# Patient Record
Sex: MALE | Race: WHITE | Employment: OTHER | URBAN - METROPOLITAN AREA
[De-identification: names, ages, dates, MRNs, and addresses within clinical notes are randomized per-mention and may not be internally consistent; named-entity substitution may affect disease eponyms.]

---

## 2017-05-18 ENCOUNTER — OFFICE VISIT (OUTPATIENT)
Dept: FAMILY MEDICINE CLINIC | Age: 62
End: 2017-05-18

## 2017-05-18 VITALS
BODY MASS INDEX: 36.43 KG/M2 | WEIGHT: 246 LBS | SYSTOLIC BLOOD PRESSURE: 120 MMHG | HEART RATE: 76 BPM | HEIGHT: 69 IN | DIASTOLIC BLOOD PRESSURE: 70 MMHG

## 2017-05-18 DIAGNOSIS — N52.9 VASCULOGENIC ERECTILE DYSFUNCTION, UNSPECIFIED VASCULOGENIC ERECTILE DYSFUNCTION TYPE: ICD-10-CM

## 2017-05-18 DIAGNOSIS — M15.9 PRIMARY OSTEOARTHRITIS INVOLVING MULTIPLE JOINTS: ICD-10-CM

## 2017-05-18 DIAGNOSIS — K21.9 GASTROESOPHAGEAL REFLUX DISEASE WITHOUT ESOPHAGITIS: ICD-10-CM

## 2017-05-18 DIAGNOSIS — I25.10 CORONARY ARTERY DISEASE INVOLVING NATIVE CORONARY ARTERY OF NATIVE HEART WITHOUT ANGINA PECTORIS: Primary | ICD-10-CM

## 2017-05-18 DIAGNOSIS — Z12.11 SCREEN FOR COLON CANCER: ICD-10-CM

## 2017-05-18 LAB
A/G RATIO: 1.7 (ref 1.1–2.2)
ALBUMIN SERPL-MCNC: 4.5 G/DL (ref 3.4–5)
ALP BLD-CCNC: 43 U/L (ref 40–129)
ALT SERPL-CCNC: 26 U/L (ref 10–40)
ANION GAP SERPL CALCULATED.3IONS-SCNC: 14 MMOL/L (ref 3–16)
AST SERPL-CCNC: 27 U/L (ref 15–37)
BILIRUB SERPL-MCNC: 0.3 MG/DL (ref 0–1)
BUN BLDV-MCNC: 11 MG/DL (ref 7–20)
CALCIUM SERPL-MCNC: 9.5 MG/DL (ref 8.3–10.6)
CHLORIDE BLD-SCNC: 98 MMOL/L (ref 99–110)
CHOLESTEROL, TOTAL: 194 MG/DL (ref 0–199)
CO2: 25 MMOL/L (ref 21–32)
CREAT SERPL-MCNC: 0.9 MG/DL (ref 0.8–1.3)
GFR AFRICAN AMERICAN: >60
GFR NON-AFRICAN AMERICAN: >60
GLOBULIN: 2.7 G/DL
GLUCOSE BLD-MCNC: 106 MG/DL (ref 70–99)
HDLC SERPL-MCNC: 31 MG/DL (ref 40–60)
LDL CHOLESTEROL CALCULATED: 118 MG/DL
POTASSIUM SERPL-SCNC: 4.4 MMOL/L (ref 3.5–5.1)
PROSTATE SPECIFIC ANTIGEN: 10.06 NG/ML (ref 0–4)
SODIUM BLD-SCNC: 137 MMOL/L (ref 136–145)
TOTAL PROTEIN: 7.2 G/DL (ref 6.4–8.2)
TRIGL SERPL-MCNC: 226 MG/DL (ref 0–150)
VLDLC SERPL CALC-MCNC: 45 MG/DL

## 2017-05-18 PROCEDURE — 99213 OFFICE O/P EST LOW 20 MIN: CPT | Performed by: FAMILY MEDICINE

## 2017-05-18 PROCEDURE — 36415 COLL VENOUS BLD VENIPUNCTURE: CPT | Performed by: FAMILY MEDICINE

## 2017-05-18 RX ORDER — DOXAZOSIN MESYLATE 4 MG/1
TABLET ORAL
Qty: 90 TABLET | Refills: 1 | Status: SHIPPED | OUTPATIENT
Start: 2017-05-18 | End: 2017-10-25 | Stop reason: SDUPTHER

## 2017-05-18 RX ORDER — ATORVASTATIN CALCIUM 40 MG/1
40 TABLET, FILM COATED ORAL DAILY
Qty: 30 TABLET | Refills: 5 | Status: CANCELLED | OUTPATIENT
Start: 2017-05-18

## 2017-05-18 RX ORDER — ATORVASTATIN CALCIUM 80 MG/1
80 TABLET, FILM COATED ORAL DAILY
Qty: 90 TABLET | Refills: 1 | Status: SHIPPED | OUTPATIENT
Start: 2017-05-18 | End: 2017-10-25 | Stop reason: SDUPTHER

## 2017-05-18 RX ORDER — OMEPRAZOLE 40 MG/1
CAPSULE, DELAYED RELEASE ORAL
Qty: 90 CAPSULE | Refills: 1 | Status: SHIPPED | OUTPATIENT
Start: 2017-05-18 | End: 2017-10-25 | Stop reason: SDUPTHER

## 2017-05-18 RX ORDER — SERTRALINE HYDROCHLORIDE 100 MG/1
TABLET, FILM COATED ORAL
Qty: 100 TABLET | Refills: 5 | Status: SHIPPED | OUTPATIENT
Start: 2017-05-18 | End: 2017-10-25 | Stop reason: SDUPTHER

## 2017-05-18 RX ORDER — ALPRAZOLAM 1 MG/1
TABLET ORAL
Qty: 90 TABLET | Refills: 2 | Status: SHIPPED | OUTPATIENT
Start: 2017-05-18 | End: 2017-07-11 | Stop reason: SDUPTHER

## 2017-05-18 RX ORDER — ISOSORBIDE DINITRATE 30 MG/1
TABLET ORAL
Qty: 90 TABLET | Refills: 1 | Status: CANCELLED | OUTPATIENT
Start: 2017-05-18

## 2017-05-18 RX ORDER — SILDENAFIL 100 MG/1
100 TABLET, FILM COATED ORAL PRN
Qty: 6 TABLET | Refills: 2 | COMMUNITY
Start: 2017-05-18 | End: 2020-05-05

## 2017-05-18 ASSESSMENT — ENCOUNTER SYMPTOMS
SHORTNESS OF BREATH: 0
CHOKING: 0
ABDOMINAL PAIN: 0
ABDOMINAL DISTENTION: 0
CHEST TIGHTNESS: 0

## 2017-05-20 LAB
SEX HORMONE BINDING GLOBULIN: 38 NMOL/L (ref 11–80)
TESTOSTERONE FREE PERCENT: 1.7 % (ref 1.6–2.9)
TESTOSTERONE FREE, CALC: 47 PG/ML (ref 47–244)
TESTOSTERONE TOTAL-MALE: 284 NG/DL (ref 300–720)

## 2017-05-22 ENCOUNTER — TELEPHONE (OUTPATIENT)
Dept: FAMILY MEDICINE CLINIC | Age: 62
End: 2017-05-22

## 2017-07-11 ENCOUNTER — OFFICE VISIT (OUTPATIENT)
Dept: FAMILY MEDICINE CLINIC | Age: 62
End: 2017-07-11

## 2017-07-11 VITALS
HEIGHT: 69 IN | BODY MASS INDEX: 36.58 KG/M2 | SYSTOLIC BLOOD PRESSURE: 140 MMHG | OXYGEN SATURATION: 97 % | WEIGHT: 247 LBS | HEART RATE: 79 BPM | DIASTOLIC BLOOD PRESSURE: 74 MMHG

## 2017-07-11 DIAGNOSIS — Z79.899 LONG TERM USE OF DRUG: ICD-10-CM

## 2017-07-11 DIAGNOSIS — F41.9 ANXIETY: ICD-10-CM

## 2017-07-11 DIAGNOSIS — R07.9 CHEST PAIN, UNSPECIFIED TYPE: ICD-10-CM

## 2017-07-11 DIAGNOSIS — Z22.322 MRSA (METHICILLIN RESISTANT STAPH AUREUS) CULTURE POSITIVE: Primary | ICD-10-CM

## 2017-07-11 DIAGNOSIS — G25.81 RLS (RESTLESS LEGS SYNDROME): ICD-10-CM

## 2017-07-11 DIAGNOSIS — E78.1 HYPERGLYCERIDEMIA: ICD-10-CM

## 2017-07-11 LAB
AMPHETAMINE SCREEN, URINE: NORMAL
BARBITURATE SCREEN, URINE: NORMAL
BENZODIAZEPINE SCREEN, URINE: NORMAL
COCAINE METABOLITE SCREEN URINE: NORMAL
MDMA URINE: NORMAL
METHADONE SCREEN, URINE: NORMAL
METHAMPHETAMINE, URINE: NORMAL
OPIATE SCREEN URINE: NORMAL
OXYCODONE SCREEN URINE: NORMAL
PHENCYCLIDINE SCREEN URINE: NORMAL
PROPOXYPHENE SCREEN, URINE: NORMAL
THC: NORMAL
TRICYCLIC ANTIDEPRESSANTS, UR: NORMAL

## 2017-07-11 PROCEDURE — 99213 OFFICE O/P EST LOW 20 MIN: CPT | Performed by: NURSE PRACTITIONER

## 2017-07-11 PROCEDURE — 80305 DRUG TEST PRSMV DIR OPT OBS: CPT | Performed by: NURSE PRACTITIONER

## 2017-07-11 RX ORDER — CLINDAMYCIN HYDROCHLORIDE 300 MG/1
300 CAPSULE ORAL 3 TIMES DAILY
Qty: 30 CAPSULE | Refills: 0 | Status: SHIPPED | OUTPATIENT
Start: 2017-07-11 | End: 2017-07-21

## 2017-07-11 RX ORDER — NITROGLYCERIN 0.4 MG/1
TABLET SUBLINGUAL
Refills: 1 | COMMUNITY
Start: 2017-05-10 | End: 2020-05-05 | Stop reason: SDUPTHER

## 2017-07-11 RX ORDER — DOXYCYCLINE 100 MG/1
CAPSULE ORAL
Refills: 0 | COMMUNITY
Start: 2017-07-02 | End: 2017-10-25 | Stop reason: ALTCHOICE

## 2017-07-11 RX ORDER — ALPRAZOLAM 1 MG/1
TABLET ORAL
Qty: 45 TABLET | Refills: 2 | Status: SHIPPED | OUTPATIENT
Start: 2017-07-11 | End: 2017-10-25 | Stop reason: SDUPTHER

## 2017-07-11 RX ORDER — FENOFIBRATE 160 MG/1
TABLET ORAL
Refills: 11 | COMMUNITY
Start: 2017-06-30 | End: 2020-05-05

## 2017-07-11 ASSESSMENT — ENCOUNTER SYMPTOMS
COUGH: 0
NAUSEA: 0
WHEEZING: 0
VOMITING: 0
SHORTNESS OF BREATH: 0
COLOR CHANGE: 1
CHOKING: 0
CHEST TIGHTNESS: 0

## 2017-10-02 ENCOUNTER — TELEPHONE (OUTPATIENT)
Dept: OTHER | Facility: CLINIC | Age: 62
End: 2017-10-02

## 2017-10-25 ENCOUNTER — OFFICE VISIT (OUTPATIENT)
Dept: FAMILY MEDICINE CLINIC | Age: 62
End: 2017-10-25

## 2017-10-25 VITALS
HEART RATE: 72 BPM | HEIGHT: 69 IN | DIASTOLIC BLOOD PRESSURE: 60 MMHG | SYSTOLIC BLOOD PRESSURE: 110 MMHG | OXYGEN SATURATION: 96 % | WEIGHT: 258 LBS | BODY MASS INDEX: 38.21 KG/M2

## 2017-10-25 DIAGNOSIS — R97.20 ELEVATED PSA: ICD-10-CM

## 2017-10-25 DIAGNOSIS — K21.9 GASTROESOPHAGEAL REFLUX DISEASE WITHOUT ESOPHAGITIS: ICD-10-CM

## 2017-10-25 DIAGNOSIS — F41.9 ANXIETY: ICD-10-CM

## 2017-10-25 DIAGNOSIS — N40.0 BENIGN PROSTATIC HYPERPLASIA WITHOUT LOWER URINARY TRACT SYMPTOMS: ICD-10-CM

## 2017-10-25 DIAGNOSIS — I25.10 CORONARY ARTERY DISEASE INVOLVING NATIVE CORONARY ARTERY OF NATIVE HEART WITHOUT ANGINA PECTORIS: ICD-10-CM

## 2017-10-25 DIAGNOSIS — E78.2 MIXED HYPERLIPIDEMIA: Primary | ICD-10-CM

## 2017-10-25 DIAGNOSIS — E66.09 CLASS 2 OBESITY DUE TO EXCESS CALORIES WITHOUT SERIOUS COMORBIDITY WITH BODY MASS INDEX (BMI) OF 36.0 TO 36.9 IN ADULT: ICD-10-CM

## 2017-10-25 PROCEDURE — 99213 OFFICE O/P EST LOW 20 MIN: CPT | Performed by: FAMILY MEDICINE

## 2017-10-25 RX ORDER — SERTRALINE HYDROCHLORIDE 100 MG/1
TABLET, FILM COATED ORAL
Qty: 100 TABLET | Refills: 5 | Status: SHIPPED | OUTPATIENT
Start: 2017-10-25 | End: 2020-07-07 | Stop reason: SDUPTHER

## 2017-10-25 RX ORDER — ATORVASTATIN CALCIUM 80 MG/1
80 TABLET, FILM COATED ORAL DAILY
Qty: 90 TABLET | Refills: 1 | Status: SHIPPED | OUTPATIENT
Start: 2017-10-25 | End: 2020-05-05 | Stop reason: SDUPTHER

## 2017-10-25 RX ORDER — ISOSORBIDE MONONITRATE 30 MG/1
30 TABLET, EXTENDED RELEASE ORAL
COMMUNITY
Start: 2017-04-12 | End: 2017-10-25 | Stop reason: ALTCHOICE

## 2017-10-25 RX ORDER — DOXAZOSIN MESYLATE 4 MG/1
TABLET ORAL
Qty: 90 TABLET | Refills: 1 | Status: SHIPPED | OUTPATIENT
Start: 2017-10-25 | End: 2020-05-05

## 2017-10-25 RX ORDER — OMEPRAZOLE 40 MG/1
CAPSULE, DELAYED RELEASE ORAL
Qty: 90 CAPSULE | Refills: 1 | Status: SHIPPED | OUTPATIENT
Start: 2017-10-25 | End: 2020-05-05

## 2017-10-25 RX ORDER — ALPRAZOLAM 1 MG/1
TABLET ORAL
Qty: 45 TABLET | Refills: 2 | Status: SHIPPED | OUTPATIENT
Start: 2017-10-25 | End: 2020-05-05

## 2017-10-25 RX ORDER — AMLODIPINE BESYLATE 5 MG/1
5 TABLET ORAL
COMMUNITY
Start: 2017-10-12 | End: 2020-05-05

## 2017-10-25 ASSESSMENT — ENCOUNTER SYMPTOMS
COUGH: 0
BACK PAIN: 0
CHEST TIGHTNESS: 0
CONSTIPATION: 0
SHORTNESS OF BREATH: 0
WHEEZING: 0
CHOKING: 0
ABDOMINAL PAIN: 0
DIARRHEA: 0

## 2017-10-25 NOTE — PROGRESS NOTES
Subjective:      Patient ID: Jany Pascal is a 57 y.o. male. HPIhere for BP and lipid issues  Has had 2 stents placed already    Review of Systems   Constitutional: Negative for activity change, appetite change and fatigue. HENT: Negative for dental problem and tinnitus. Respiratory: Negative for cough, choking, chest tightness, shortness of breath and wheezing. Cardiovascular: Negative for chest pain and leg swelling. Gastrointestinal: Negative for abdominal pain, constipation and diarrhea. Endocrine: Negative for polydipsia and polyphagia. Musculoskeletal: Negative for back pain and neck stiffness. Neurological: Negative for dizziness, syncope, speech difficulty and headaches. Psychiatric/Behavioral: Negative. Objective:   Physical Exam   Constitutional: No distress. HENT:   Head: Normocephalic. Mouth/Throat: Oropharynx is clear and moist. No oropharyngeal exudate. Eyes: Conjunctivae and EOM are normal.   Neck: Normal range of motion. Neck supple. No thyromegaly present. Cardiovascular: Normal rate, regular rhythm, normal heart sounds and intact distal pulses. Pulmonary/Chest: Effort normal and breath sounds normal. He has no wheezes. He has no rales. Abdominal: He exhibits no distension. There is no tenderness. Musculoskeletal: Normal range of motion. Neurological: He is alert. He has normal reflexes. Skin: Skin is warm and dry. No rash noted. He is not diaphoretic. No erythema. Psychiatric: His behavior is normal. Thought content normal.       Assessment:      1. Mixed hyperlipidemia     2. Anxiety  ALPRAZolam (XANAX) 1 MG tablet   3. Coronary artery disease involving native coronary artery of native heart without angina pectoris     4. Gastroesophageal reflux disease without esophagitis     5. Elevated PSA     6. Benign prostatic hyperplasia without lower urinary tract symptoms     7.  Class 2 obesity due to excess calories without serious comorbidity with body mass index (BMI) of 36.0 to 36.9 in adult             Plan:      Requested Prescriptions     Signed Prescriptions Disp Refills    sertraline (ZOLOFT) 100 MG tablet 100 tablet 5     Sig: TAKE 1 AND 1/2 TABLETS AT BEDTIME    omeprazole (PRILOSEC) 40 MG delayed release capsule 90 capsule 1     Sig: TAKE 1 CAPSULE BY MOUTH DAILY.     doxazosin (CARDURA) 4 MG tablet 90 tablet 1     Sig: TAKE 1 TABLET BY MOUTH DAILY    atorvastatin (LIPITOR) 80 MG tablet 90 tablet 1     Sig: Take 1 tablet by mouth daily    ALPRAZolam (XANAX) 1 MG tablet 45 tablet 2     Sig: Take 1 & 1/2 tab  AT BEDTIME FOR SLEEP PRN

## 2020-04-02 ENCOUNTER — TELEPHONE (OUTPATIENT)
Dept: FAMILY MEDICINE CLINIC | Age: 65
End: 2020-04-02

## 2020-04-02 NOTE — TELEPHONE ENCOUNTER
PT called back in regarding message from [place]. Pt opted to reschedule his appointment into June and he gave his consent to look up his records in [facility].

## 2020-05-04 NOTE — PROGRESS NOTES
Patient being evaluated by a Virtual Visit (video visit) encounter to address concerns as mentioned below. A caregiver was present when appropriate. Due to this being a TeleHealth encounter (During ODXCF-15 public health emergency), evaluation of the following organ systems was limited: Vitals/Constitutional/EENT/Resp/CV/GI//MS/Neuro/Skin/Heme-Lymph-Imm. Pursuant to the emergency declaration under the [facility], [address] [place] [address] and the [employer] Act, this Virtual Visit was conducted with patient's (and/or legal guardian's) consent, to reduce the patient's risk of exposure to COVID-19 and provide necessary medical care. The patient (and/or legal guardian) has also been advised to contact this office for worsening conditions or problems, and seek emergency medical treatment and/or call 911 if deemed necessary. Services were provided through a video synchronous discussion virtually to substitute for in-person clinic visit. HPI: Marce Hansen presents to establish care         CAD, hyperlipidemia, htn, LAD stent 2008, cath 20014 open   2017  on lipitor 80. .  metoprolol 100 bid . BP ported is normal February 2020. He denies any chest pain palpitations lower extremity edema is claudication. Does not check his blood pressure. Continues to smoke. Mildly elevated sugar in 2017 no diagnosis of prediabetes. Weight reported is 225 which is up a bit. Elevated PSA with BPH. Currently on Flomax. Negative biopsy September 2019. Believes a PSA was running in the 910 range. No family history of prostate cancer. BMI 35. Has lost a little bit of weight. Resolved GE reflux. Occasional knee pain. Mildly elevated sugar. Drives a truck. No sleep apnea or testing. Tobacco. 3/4 has tried patch 15 years ago. Denies copd. No inhalers. No wheezing pneumonias or abnormal chest x-rays recall.   Is 2. Benign essential HTN     3. Benign prostatic hyperplasia without lower urinary tract symptoms     4. Mixed hyperlipidemia     5. Cigarette nicotine dependence without complication       Coronary artery disease history of stent placement 2008. Repeat catheterization 2014 with open vessels. Continue on with aspirin atorvastatin metoprolol. Does not check his blood pressure. Is asymptomatic at this time. Refills and nitroglycerin given. Hypertension. Does not check pressure. Will get cuff or have this done intermittently. BPH prostatism with negative biopsy September 2019. Repeat in September. Hyperlipidemia. Continue statin. Laboratories to be next visit. Anxiety insomnia. Will try trazodone. Aware I did not give Xanax. Not interested in Zoloft. Tobacco addiction. Chantix discussed. Diagnosis and treatment discussed.   Possible side effects of medication reviewed  Patients questions answered  Follow up understood  Pt aware if they are not contacted about any test results , this does not mean they are normal.  They should call

## 2020-05-05 ENCOUNTER — TELEPHONE (OUTPATIENT)
Dept: FAMILY MEDICINE CLINIC | Age: 65
End: 2020-05-05

## 2020-05-05 ENCOUNTER — VIRTUAL VISIT (OUTPATIENT)
Dept: FAMILY MEDICINE CLINIC | Age: 65
End: 2020-05-05

## 2020-05-05 PROCEDURE — 99203 OFFICE O/P NEW LOW 30 MIN: CPT | Performed by: INTERNAL MEDICINE

## 2020-05-05 RX ORDER — NITROGLYCERIN 0.4 MG/1
TABLET SUBLINGUAL
Qty: 25 TABLET | Refills: 1 | Status: SHIPPED | OUTPATIENT
Start: 2020-05-05

## 2020-05-05 RX ORDER — ATORVASTATIN CALCIUM 80 MG/1
80 TABLET, FILM COATED ORAL DAILY
Qty: 90 TABLET | Refills: 0 | Status: SHIPPED | OUTPATIENT
Start: 2020-05-05 | End: 2020-10-14

## 2020-05-05 RX ORDER — TRAZODONE HYDROCHLORIDE 50 MG/1
TABLET ORAL
Qty: 30 TABLET | Refills: 0 | Status: SHIPPED | OUTPATIENT
Start: 2020-05-05 | End: 2020-07-07

## 2020-05-05 RX ORDER — METOPROLOL TARTRATE 100 MG/1
100 TABLET ORAL 2 TIMES DAILY
Qty: 180 TABLET | Refills: 0 | Status: SHIPPED | OUTPATIENT
Start: 2020-05-05 | End: 2020-09-21

## 2020-05-05 RX ORDER — TAMSULOSIN HYDROCHLORIDE 0.4 MG/1
0.4 CAPSULE ORAL DAILY
Qty: 90 CAPSULE | Refills: 1 | Status: SHIPPED | OUTPATIENT
Start: 2020-05-05 | End: 2020-07-07 | Stop reason: SDUPTHER

## 2020-05-05 RX ORDER — VARENICLINE TARTRATE 0.5 MG/1
.5-1 TABLET, FILM COATED ORAL SEE ADMIN INSTRUCTIONS
Qty: 57 TABLET | Refills: 0 | Status: SHIPPED | OUTPATIENT
Start: 2020-05-05 | End: 2020-07-07

## 2020-07-07 ENCOUNTER — OFFICE VISIT (OUTPATIENT)
Dept: FAMILY MEDICINE CLINIC | Age: 65
End: 2020-07-07

## 2020-07-07 VITALS
SYSTOLIC BLOOD PRESSURE: 154 MMHG | TEMPERATURE: 96.3 F | DIASTOLIC BLOOD PRESSURE: 85 MMHG | BODY MASS INDEX: 35.7 KG/M2 | RESPIRATION RATE: 16 BRPM | HEIGHT: 69 IN | HEART RATE: 66 BPM | WEIGHT: 241 LBS

## 2020-07-07 LAB
A/G RATIO: 1.6 (ref 1.1–2.2)
ALBUMIN SERPL-MCNC: 4.3 G/DL (ref 3.4–5)
ALP BLD-CCNC: 67 U/L (ref 40–129)
ALT SERPL-CCNC: 20 U/L (ref 10–40)
ANION GAP SERPL CALCULATED.3IONS-SCNC: 12 MMOL/L (ref 3–16)
AST SERPL-CCNC: 20 U/L (ref 15–37)
BILIRUB SERPL-MCNC: 0.6 MG/DL (ref 0–1)
BUN BLDV-MCNC: 10 MG/DL (ref 7–20)
CALCIUM SERPL-MCNC: 9.2 MG/DL (ref 8.3–10.6)
CHLORIDE BLD-SCNC: 101 MMOL/L (ref 99–110)
CHOLESTEROL, TOTAL: 109 MG/DL (ref 0–199)
CO2: 23 MMOL/L (ref 21–32)
CREAT SERPL-MCNC: 0.8 MG/DL (ref 0.8–1.3)
GFR AFRICAN AMERICAN: >60
GFR NON-AFRICAN AMERICAN: >60
GLOBULIN: 2.7 G/DL
GLUCOSE BLD-MCNC: 122 MG/DL (ref 70–99)
HDLC SERPL-MCNC: 30 MG/DL (ref 40–60)
HEPATITIS C ANTIBODY INTERPRETATION: NORMAL
LDL CHOLESTEROL CALCULATED: 36 MG/DL
POTASSIUM SERPL-SCNC: 4.4 MMOL/L (ref 3.5–5.1)
SODIUM BLD-SCNC: 136 MMOL/L (ref 136–145)
TOTAL PROTEIN: 7 G/DL (ref 6.4–8.2)
TRIGL SERPL-MCNC: 216 MG/DL (ref 0–150)
VLDLC SERPL CALC-MCNC: 43 MG/DL

## 2020-07-07 PROCEDURE — 99214 OFFICE O/P EST MOD 30 MIN: CPT | Performed by: INTERNAL MEDICINE

## 2020-07-07 PROCEDURE — 36415 COLL VENOUS BLD VENIPUNCTURE: CPT | Performed by: INTERNAL MEDICINE

## 2020-07-07 RX ORDER — TAMSULOSIN HYDROCHLORIDE 0.4 MG/1
CAPSULE ORAL
Qty: 180 CAPSULE | Refills: 1 | Status: SHIPPED | OUTPATIENT
Start: 2020-07-07 | End: 2020-11-16 | Stop reason: SDUPTHER

## 2020-07-07 RX ORDER — SERTRALINE HYDROCHLORIDE 100 MG/1
TABLET, FILM COATED ORAL
Qty: 90 TABLET | Refills: 0 | Status: SHIPPED | OUTPATIENT
Start: 2020-07-07 | End: 2020-11-16 | Stop reason: SDUPTHER

## 2020-07-07 ASSESSMENT — PATIENT HEALTH QUESTIONNAIRE - PHQ9
SUM OF ALL RESPONSES TO PHQ QUESTIONS 1-9: 0
2. FEELING DOWN, DEPRESSED OR HOPELESS: 0
SUM OF ALL RESPONSES TO PHQ9 QUESTIONS 1 & 2: 0
1. LITTLE INTEREST OR PLEASURE IN DOING THINGS: 0
SUM OF ALL RESPONSES TO PHQ QUESTIONS 1-9: 0

## 2020-07-07 NOTE — PATIENT INSTRUCTIONS
Increase flomax to 2 a night. Call for urology referral  Consider aortic aneurysm screen and follow up colonoscopy  Try good RX for chantix  Minutes of exercise daily. Consider Dr. Douglas Carlton psychology for stressors  Sunscreen and helmet  If able by blood pressure cuff. Goal blood pressure 130/80 or less. Follow back up 2 to 3 weeks for follow-up of Zoloft and blood pressure. Blood pressure twice daily with your own cuff when you have this. Patient Education        DASH Diet: Care Instructions  Your Care Instructions     The DASH diet is an eating plan that can help lower your blood pressure. DASH stands for Dietary Approaches to Stop Hypertension. Hypertension is high blood pressure. The DASH diet focuses on eating foods that are high in calcium, potassium, and magnesium. These nutrients can lower blood pressure. The foods that are highest in these nutrients are fruits, vegetables, low-fat dairy products, nuts, seeds, and legumes. But taking calcium, potassium, and magnesium supplements instead of eating foods that are high in those nutrients does not have the same effect. The DASH diet also includes whole grains, fish, and poultry. The DASH diet is one of several lifestyle changes your doctor may recommend to lower your high blood pressure. Your doctor may also want you to decrease the amount of sodium in your diet. Lowering sodium while following the DASH diet can lower blood pressure even further than just the DASH diet alone. Follow-up care is a key part of your treatment and safety. Be sure to make and go to all appointments, and call your doctor if you are having problems. It's also a good idea to know your test results and keep a list of the medicines you take. How can you care for yourself at home? Following the DASH diet  · Eat 4 to 5 servings of fruit each day. A serving is 1 medium-sized piece of fruit, ½ cup chopped or canned fruit, 1/4 cup dried fruit, or 4 ounces (½ cup) of fruit juice. Choose fruit more often than fruit juice. · Eat 4 to 5 servings of vegetables each day. A serving is 1 cup of lettuce or raw leafy vegetables, ½ cup of chopped or cooked vegetables, or 4 ounces (½ cup) of vegetable juice. Choose vegetables more often than vegetable juice. · Get 2 to 3 servings of low-fat and fat-free dairy each day. A serving is 8 ounces of milk, 1 cup of yogurt, or 1 ½ ounces of cheese. · Eat 6 to 8 servings of grains each day. A serving is 1 slice of bread, 1 ounce of dry cereal, or ½ cup of cooked rice, pasta, or cooked cereal. Try to choose whole-grain products as much as possible. · Limit lean meat, poultry, and fish to 2 servings each day. A serving is 3 ounces, about the size of a deck of cards. · Eat 4 to 5 servings of nuts, seeds, and legumes (cooked dried beans, lentils, and split peas) each week. A serving is 1/3 cup of nuts, 2 tablespoons of seeds, or ½ cup of cooked beans or peas. · Limit fats and oils to 2 to 3 servings each day. A serving is 1 teaspoon of vegetable oil or 2 tablespoons of salad dressing. · Limit sweets and added sugars to 5 servings or less a week. A serving is 1 tablespoon jelly or jam, ½ cup sorbet, or 1 cup of lemonade. · Eat less than 2,300 milligrams (mg) of sodium a day. If you limit your sodium to 1,500 mg a day, you can lower your blood pressure even more. Tips for success  · Start small. Do not try to make dramatic changes to your diet all at once. You might feel that you are missing out on your favorite foods and then be more likely to not follow the plan. Make small changes, and stick with them. Once those changes become habit, add a few more changes. · Try some of the following:  ? Make it a goal to eat a fruit or vegetable at every meal and at snacks. This will make it easy to get the recommended amount of fruits and vegetables each day. ? Try yogurt topped with fruit and nuts for a snack or healthy dessert.   ? Add lettuce, tomato, cucumber, and onion to sandwiches. ? Combine a ready-made pizza crust with low-fat mozzarella cheese and lots of vegetable toppings. Try using tomatoes, squash, spinach, broccoli, carrots, cauliflower, and onions. ? Have a variety of cut-up vegetables with a low-fat dip as an appetizer instead of chips and dip. ? Sprinkle sunflower seeds or chopped almonds over salads. Or try adding chopped walnuts or almonds to cooked vegetables. ? Try some vegetarian meals using beans and peas. Add garbanzo or kidney beans to salads. Make burritos and tacos with mashed blankenship beans or black beans. Where can you learn more? Go to https://Avanir Pharmaceuticalsdidiereb.[employer]. org and sign in to your Visual Threat account. Enter J381 in the SearchMe box to learn more about \"DASH Diet: Care Instructions. \"     If you do not have an account, please click on the \"Sign Up Now\" link. Current as of: December 16, 2019               Content Version: 12.5  © 3949-2467 [employer]. Care instructions adapted under license by [facility] ([facility]). If you have questions about a medical condition or this instruction, always ask your healthcare professional. [address] any warranty or liability for your use of this information. Patient Education        Learning About Mindfulness for Stress  Stress is what you feel when you have to handle more than you are used to. A lot of things can cause stress. You may feel stress when you go on a job interview, take a test, or run a race. This kind of short-term stress is normal and even useful. It can help you if you need to work hard or react quickly. Stress also can last a long time. Long-term stress is caused by stressful situations or events. Examples of long-term stress include long-term health problems, ongoing problems at work, and conflicts in your family. Long-term stress can harm your health. Mindfulness is a focus only on things happening in the present moment. It's a process of purposefully paying attention to and being aware of your surroundings, your emotions, your thoughts, and how your body feels. You are aware of these things, but you aren't judging these experiences as \"good\" or \"bad. \" Mindfulness can help you learn to calm your mind and body to help you cope with illness, pain, and stress. How does mindfulness help to relieve stress? Mindfulness can help quiet your mind and relax your body. Studies show that it can help some people sleep better, feel less anxious, and bring their blood pressure down. And it's been shown to help some people live and cope better with certain health problems like heart disease, depression, chronic pain, and cancer. How do you practice mindfulness? To be mindful is to pay attention, to be present, and to be accepting. · When you're mindful, you do just one thing and you pay close attention to that one thing. For example, you may sit quietly and notice your emotions or how your food tastes and smells. · When you're present, you focus on the things that are happening right now. You let go of your thoughts about the past and the future. When you dwell on the past or the future, you miss moments that can heal and strengthen you. You may miss moments like hearing a child laugh or seeing a friendly face when you think you're all alone. · When you're accepting, you don't  the present moment. Instead you accept your thoughts and feelings as they come. You can practice anytime, anywhere, and in any way you choose. You can practice in many ways. Here are a few ideas:  · While doing your chores, like washing the dishes, let your mind focus on what's in your hand. What does the dish feel like? Is the water warm or cold? · Go outside and take a few deep breaths. What is the air like? Is it warm or cold? · When you can, take some time at the start of your day to sit alone and think. · Take a slow walk by yourself.  Count your steps while you breathe in and out. · Try yoga breathing exercises, stretches, and poses to strengthen and relax your muscles. · At work, if you can, try to stop for a few moments each hour. Note how your body feels. Let yourself regroup and let your mind settle before you return to what you were doing. · If you struggle with anxiety or \"worry thoughts,\" imagine your mind as a blue sanaz and your worry thoughts as clouds. Now imagine those worry thoughts floating across your mind's sanaz. Just let them pass by as you watch. Follow-up care is a key part of your treatment and safety. Be sure to make and go to all appointments, and call your doctor if you are having problems. It's also a good idea to know your test results and keep a list of the medicines you take. Where can you learn more? Go to https://RoboCVpejuanjoseewrandolph.Jarvam. org and sign in to your Listen Up account. Enter K163 in the Capzles box to learn more about \"Learning About Mindfulness for Stress. \"     If you do not have an account, please click on the \"Sign Up Now\" link. Current as of: December 16, 2019               Content Version: 12.5  © 0552-7156 [employer]. Care instructions adapted under license by [facility] ([facility]). If you have questions about a medical condition or this instruction, always ask your healthcare professional. [place] [address] any warranty or liability for your use of this information. Patient Education        Rotator Cuff Problems: Care Instructions  Your Care Instructions     The rotator cuff is a group of tendons and muscles around the shoulder that keeps the shoulder joint stable and allows you to raise and rotate your arm. Over time, daily wear and exercise can cause the tendons to rub on the bones of your shoulder. This is called impingement. This condition may cause the tendons to bruise, degenerate, or tear. In many people, these problems do not cause pain.  When they do cause pain, you can use rest, physical therapy, ice and heat, and anti-inflammatory medicine to reduce pain and swelling. If you still have pain after trying these treatments, you and your doctor can discuss having a steroid injection or surgery. Follow-up care is a key part of your treatment and safety. Be sure to make and go to all appointments, and call your doctor if you are having problems. It's also a good idea to know your test results and keep a list of the medicines you take. How can you care for yourself at home? · Be safe with medicines. Read and follow all instructions on the label. ? If the doctor gave you a prescription medicine for pain, take it as prescribed. ? If you are not taking a prescription pain medicine, ask your doctor if you can take an over-the-counter medicine. · Put ice or a cold pack on your shoulder for 10 to 20 minutes at a time. Try to do this every 1 to 2 hours for the next 3 days (when you are awake). Put a thin cloth between the ice pack and your skin. · After 3 days, put a warm, wet towel on your shoulder. This is to relax the muscles and increase blood flow. While holding the towel on your shoulder, lean forward so your arm hangs freely, and gently swing your arm back and forth like a pendulum. You also can do this standing under a warm shower. · Follow your doctor's advice for physical therapy. When your doctor says it is okay, try these stretching exercises. Do them slowly to avoid injury. Put a warm, wet towel on your shoulder before exercising. Stop any exercise that increases pain. ? Range-of-motion exercises. If it is not too painful, stretch your arm in four directions: across the body, up the back, to the side, and overhead. ? Pendulum exercise. Lean forward and hold onto a table or the back of a chair with your good arm. Bend at the waist, letting the arm with the sore shoulder hang straight down.  Swing your arm back and forth like a pendulum, then in circles [email or web address].[email or web address] and sign in to your opinions.ht account. Enter E207 in the [facility] box to learn more about \"Rotator Cuff Problems: Care Instructions. \"     If you do not have an account, please click on the \"Sign Up Now\" link. Current as of: March 2, 2020               Content Version: 12.5  © 2590-9776 [employer], [facility]. Care instructions adapted under license by [facility] ([facility]). If you have questions about a medical condition or this instruction, always ask your healthcare professional. [address] any warranty or liability for your use of this information.
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
## 2020-07-08 LAB
ESTIMATED AVERAGE GLUCOSE: 139.9 MG/DL
HBA1C MFR BLD: 6.5 %

## 2020-07-09 LAB
PROSTATE SPECIFIC ANTIGEN FREE: 2.6 UG/L
PROSTATE SPECIFIC ANTIGEN PERCENT FREE: 18.3 %
PROSTATE SPECIFIC ANTIGEN: 14.2 UG/L (ref 0–4)

## 2020-07-28 ENCOUNTER — NURSE ONLY (OUTPATIENT)
Dept: FAMILY MEDICINE CLINIC | Age: 65
End: 2020-07-28

## 2020-07-28 VITALS
TEMPERATURE: 97.1 F | DIASTOLIC BLOOD PRESSURE: 74 MMHG | RESPIRATION RATE: 16 BRPM | OXYGEN SATURATION: 96 % | HEART RATE: 78 BPM | SYSTOLIC BLOOD PRESSURE: 134 MMHG

## 2020-11-16 ENCOUNTER — OFFICE VISIT (OUTPATIENT)
Dept: FAMILY MEDICINE CLINIC | Age: 65
End: 2020-11-16
Payer: MEDICARE

## 2020-11-16 VITALS
TEMPERATURE: 97.5 F | WEIGHT: 229 LBS | RESPIRATION RATE: 16 BRPM | SYSTOLIC BLOOD PRESSURE: 155 MMHG | HEIGHT: 69 IN | HEART RATE: 69 BPM | BODY MASS INDEX: 33.92 KG/M2 | OXYGEN SATURATION: 96 % | DIASTOLIC BLOOD PRESSURE: 70 MMHG

## 2020-11-16 LAB
A/G RATIO: 1.5 (ref 1.1–2.2)
ALBUMIN SERPL-MCNC: 4.1 G/DL (ref 3.4–5)
ALP BLD-CCNC: 75 U/L (ref 40–129)
ALT SERPL-CCNC: 22 U/L (ref 10–40)
ANION GAP SERPL CALCULATED.3IONS-SCNC: 11 MMOL/L (ref 3–16)
AST SERPL-CCNC: 22 U/L (ref 15–37)
BASOPHILS ABSOLUTE: 0 K/UL (ref 0–0.2)
BASOPHILS RELATIVE PERCENT: 0.4 %
BILIRUB SERPL-MCNC: 0.4 MG/DL (ref 0–1)
BILIRUBIN URINE: ABNORMAL
BLOOD, URINE: ABNORMAL
BUN BLDV-MCNC: 12 MG/DL (ref 7–20)
CALCIUM SERPL-MCNC: 9.5 MG/DL (ref 8.3–10.6)
CHLORIDE BLD-SCNC: 101 MMOL/L (ref 99–110)
CLARITY: ABNORMAL
CO2: 25 MMOL/L (ref 21–32)
COLOR: ABNORMAL
CREAT SERPL-MCNC: 0.8 MG/DL (ref 0.8–1.3)
EOSINOPHILS ABSOLUTE: 0 K/UL (ref 0–0.6)
EOSINOPHILS RELATIVE PERCENT: 0.4 %
EPITHELIAL CELLS, UA: 3 /HPF (ref 0–5)
GFR AFRICAN AMERICAN: >60
GFR NON-AFRICAN AMERICAN: >60
GLOBULIN: 2.8 G/DL
GLUCOSE BLD-MCNC: 111 MG/DL (ref 70–99)
GLUCOSE URINE: NEGATIVE MG/DL
HCT VFR BLD CALC: 41.9 % (ref 40.5–52.5)
HEMOGLOBIN: 14.4 G/DL (ref 13.5–17.5)
HYALINE CASTS: 5 /LPF (ref 0–8)
INR BLD: 1.02 (ref 0.86–1.14)
KETONES, URINE: 15 MG/DL
LEUKOCYTE ESTERASE, URINE: ABNORMAL
LYMPHOCYTES ABSOLUTE: 2.2 K/UL (ref 1–5.1)
LYMPHOCYTES RELATIVE PERCENT: 24.1 %
MCH RBC QN AUTO: 31.6 PG (ref 26–34)
MCHC RBC AUTO-ENTMCNC: 34.5 G/DL (ref 31–36)
MCV RBC AUTO: 91.6 FL (ref 80–100)
MICROSCOPIC EXAMINATION: YES
MONOCYTES ABSOLUTE: 0.7 K/UL (ref 0–1.3)
MONOCYTES RELATIVE PERCENT: 8 %
NEUTROPHILS ABSOLUTE: 6.1 K/UL (ref 1.7–7.7)
NEUTROPHILS RELATIVE PERCENT: 67.1 %
NITRITE, URINE: POSITIVE
PDW BLD-RTO: 14.1 % (ref 12.4–15.4)
PH UA: 5.5 (ref 5–8)
PLATELET # BLD: 220 K/UL (ref 135–450)
PMV BLD AUTO: 9.2 FL (ref 5–10.5)
POTASSIUM SERPL-SCNC: 4.3 MMOL/L (ref 3.5–5.1)
PROTEIN UA: >=300 MG/DL
PROTHROMBIN TIME: 11.8 SEC (ref 10–13.2)
RBC # BLD: 4.57 M/UL (ref 4.2–5.9)
RBC UA: >900 /HPF (ref 0–4)
SODIUM BLD-SCNC: 137 MMOL/L (ref 136–145)
SPECIFIC GRAVITY UA: 1.02 (ref 1–1.03)
TOTAL CK: 97 U/L (ref 39–308)
TOTAL PROTEIN: 6.9 G/DL (ref 6.4–8.2)
URINE TYPE: ABNORMAL
UROBILINOGEN, URINE: 1 E.U./DL
WBC # BLD: 9 K/UL (ref 4–11)
WBC UA: 45 /HPF (ref 0–5)

## 2020-11-16 PROCEDURE — 36415 COLL VENOUS BLD VENIPUNCTURE: CPT | Performed by: INTERNAL MEDICINE

## 2020-11-16 PROCEDURE — 99214 OFFICE O/P EST MOD 30 MIN: CPT | Performed by: INTERNAL MEDICINE

## 2020-11-16 RX ORDER — TAMSULOSIN HYDROCHLORIDE 0.4 MG/1
CAPSULE ORAL
Qty: 180 CAPSULE | Refills: 1 | Status: SHIPPED | OUTPATIENT
Start: 2020-11-16

## 2020-11-16 RX ORDER — SERTRALINE HYDROCHLORIDE 100 MG/1
TABLET, FILM COATED ORAL
Qty: 90 TABLET | Refills: 0 | Status: SHIPPED | OUTPATIENT
Start: 2020-11-16
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
## 2020-11-17 ENCOUNTER — TELEPHONE (OUTPATIENT)
Dept: FAMILY MEDICINE CLINIC | Age: 65
End: 2020-11-17

## 2020-11-17 LAB
ESTIMATED AVERAGE GLUCOSE: 139.9 MG/DL
HBA1C MFR BLD: 6.5 %

## 2020-11-17 RX ORDER — CIPROFLOXACIN 500 MG/1
500 TABLET, FILM COATED ORAL 2 TIMES DAILY
Qty: 14 TABLET | Refills: 0 | Status: SHIPPED | OUTPATIENT
Start: 2020-11-17 | End: 2020-11-24

## 2020-11-17 NOTE — TELEPHONE ENCOUNTER
Spoke with patient and let him know not to get CT scan until urine culture comes back. Aware abx is getting called into pharmacy. His scan is for tomorrow morning. Should he go head and cancel now or wait until this afternoon to let him know to cancel or not.

## 2020-11-18 LAB — URINE CULTURE, ROUTINE: NORMAL

## 2020-11-20 ENCOUNTER — HOSPITAL ENCOUNTER (OUTPATIENT)
Dept: CT IMAGING | Age: 65
Discharge: HOME | End: 2020-11-20

## 2020-11-20 PROBLEM — R31.9 HEMATURIA: Status: ACTIVE | Noted: 2020-11-20

## 2020-11-20 PROCEDURE — 6360000004 HC RX CONTRAST MEDICATION: Performed by: INTERNAL MEDICINE

## 2020-11-20 PROCEDURE — 74178 CT ABD&PLV WO CNTR FLWD CNTR: CPT

## 2020-11-20 RX ADMIN — IOPAMIDOL 75 ML: 755 INJECTION, SOLUTION INTRAVENOUS at 06:54

## 2020-12-21 RX ORDER — METOPROLOL TARTRATE 100 MG/1
100 TABLET ORAL 2 TIMES DAILY
Qty: 180 TABLET | Refills: 1 | Status: SHIPPED | OUTPATIENT
Start: 2020-12-21

## 2020-12-23 ENCOUNTER — NURSE ONLY (OUTPATIENT)
Dept: FAMILY MEDICINE CLINIC | Age: 65
End: 2020-12-23

## 2020-12-23 VITALS — SYSTOLIC BLOOD PRESSURE: 130 MMHG | TEMPERATURE: 97.5 F | DIASTOLIC BLOOD PRESSURE: 64 MMHG

## 2021-01-05 PROBLEM — C68.9 UROTHELIAL CARCINOMA (HCC): Status: ACTIVE | Noted: 2021-01-05

## 2024-05-06 ENCOUNTER — TRANSCRIBE ORDERS (OUTPATIENT)
Dept: ADMINISTRATIVE | Age: 69
End: 2024-05-06

## 2024-05-06 DIAGNOSIS — I42.9 CARDIOMYOPATHY, UNSPECIFIED TYPE (HCC): Primary | ICD-10-CM

## 2024-05-07 ENCOUNTER — HOSPITAL ENCOUNTER (OUTPATIENT)
Age: 69
Discharge: HOME | End: 2024-05-09
Payer: OTHER GOVERNMENT

## 2024-05-07 DIAGNOSIS — I42.9 CARDIOMYOPATHY (HCC): Primary | ICD-10-CM

## 2024-05-07 LAB
ECHO AO ROOT DIAM: 3.7 CM
ECHO AV AREA PEAK VELOCITY: 2.1 CM2
ECHO AV AREA VTI: 2.3 CM2
ECHO AV MEAN GRADIENT: 10 MMHG
ECHO AV MEAN VELOCITY: 1.5 M/S
ECHO AV PEAK GRADIENT: 19 MMHG
ECHO AV PEAK VELOCITY: 2.2 M/S
ECHO AV VELOCITY RATIO: 0.36
ECHO AV VTI: 52.9 CM
ECHO LA AREA 2C: 18.5 CM2
ECHO LA AREA 4C: 16 CM2
ECHO LA MAJOR AXIS: 4.3 CM
ECHO LA MINOR AXIS: 5.1 CM
ECHO LA VOL BP: 55 ML (ref 18–58)
ECHO LA VOL MOD A2C: 55 ML (ref 18–58)
ECHO LA VOL MOD A4C: 46 ML (ref 18–58)
ECHO LV E' LATERAL VELOCITY: 8 CM/S
ECHO LV E' SEPTAL VELOCITY: 6 CM/S
ECHO LV EDV 3D: 128 ML
ECHO LV EJECTION FRACTION 3D: 60 %
ECHO LV ESV 3D: 52 ML
ECHO LV FRACTIONAL SHORTENING: 26 % (ref 28–44)
ECHO LV INTERNAL DIMENSION DIASTOLIC: 4.7 CM (ref 4.2–5.9)
ECHO LV INTERNAL DIMENSION SYSTOLIC: 3.5 CM
ECHO LV IVSD: 1.6 CM (ref 0.6–1)
ECHO LV MASS 2D: 279.4 G (ref 88–224)
ECHO LV MASS 3D: 144 G
ECHO LV POSTERIOR WALL DIASTOLIC: 1.3 CM (ref 0.6–1)
ECHO LV RELATIVE WALL THICKNESS RATIO: 0.55
ECHO LVOT AREA: 5.7 CM2
ECHO LVOT AV VTI INDEX: 0.4
ECHO LVOT DIAM: 2.7 CM
ECHO LVOT MEAN GRADIENT: 2 MMHG
ECHO LVOT PEAK GRADIENT: 3 MMHG
ECHO LVOT PEAK VELOCITY: 0.8 M/S
ECHO LVOT SV: 121.3 ML
ECHO LVOT VTI: 21.2 CM
ECHO MV A VELOCITY: 0.77 M/S
ECHO MV E DECELERATION TIME (DT): 285 MS
ECHO MV E VELOCITY: 0.75 M/S
ECHO MV E/A RATIO: 0.97
ECHO MV E/E' LATERAL: 9.38
ECHO MV E/E' RATIO (AVERAGED): 10.94
ECHO PV MAX VELOCITY: 0.8 M/S
ECHO PV MEAN GRADIENT: 1 MMHG
ECHO PV MEAN VELOCITY: 0.6 M/S
ECHO PV PEAK GRADIENT: 3 MMHG
ECHO PV VTI: 16.6 CM
ECHO RA AREA 4C: 15.4 CM2
ECHO RA VOLUME: 43 ML
ECHO RV BASAL DIMENSION: 3.6 CM
ECHO RV FREE WALL PEAK S': 12 CM/S
ECHO RV MID DIMENSION: 2.3 CM
ECHO RV TAPSE: 3.1 CM (ref 1.7–?)

## 2024-05-07 PROCEDURE — 93306 TTE W/DOPPLER COMPLETE: CPT | Performed by: INTERNAL MEDICINE

## 2024-05-07 PROCEDURE — 93306 TTE W/DOPPLER COMPLETE: CPT
